# Patient Record
(demographics unavailable — no encounter records)

---

## 2025-05-14 NOTE — REVIEW OF SYSTEMS
[Eye Redness] : redness [Eye Itching] : itchy eyes [Rhinorrhea] : rhinorrhea [Nasal Congestion] : nasal congestion [Nasal Itching] : nasal itching [Throat Itching] : throat itching [Post Nasal Drip] : post nasal drip [Sneezing] : sneezing [Cough] : cough [Pruritus] : pruritus [Nl] : Gastrointestinal [Difficulty Breathing] : no dyspnea [Wheezing] : no wheezing [de-identified] : c/o pruritic ?hive on chin area

## 2025-05-14 NOTE — PHYSICAL EXAM
[Alert] : alert [No Acute Distress] : no acute distress [Normal TMs] : both tympanic membranes were normal [Normal Lips/Tongue] : the lips and tongue were normal [Normal Outer Ear/Nose] : the ears and nose were normal in appearance [Normal Tonsils] : normal tonsils [Supple] : the neck was supple [Normal Rate and Effort] : normal respiratory rhythm and effort [Normal Rate] : heart rate was normal  [Skin Intact] : skin intact  [Pale mucosa] : no pale mucosa [Boggy Nasal Turbinates] : no boggy and/or pale nasal turbinates [Pharyngeal erythema] : no pharyngeal erythema [Posterior Pharyngeal Cobblestoning] : no posterior pharyngeal cobblestoning [Clear Rhinorrhea] : no clear rhinorrhea was seen [Wheezing] : no wheezing was heard [de-identified] : mild erythema noted in nasal passages, maxillary sinuses - TTP b/l

## 2025-05-14 NOTE — HISTORY OF PRESENT ILLNESS
[de-identified] : 58-year-old female here for allergy evaluation. 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  When exposed to shellfish she does develop hives, cough, wheezing and shortness of breath.  She does have an EpiPen to use for emergencies however it is .  Patient does report that she avoids going to certain supermarkets and restaurants due to her airborne allergy to shellfish.  Patient okay with finfish.  Patient also has history of asthma for which she sees a pulmonologist once a year.  She does have albuterol to use as needed but she requires it less than once a month.  Pt does have sample of Asmanex inhaler given by pulm however she did not use it. Pt also uses Singulair 10mg from March to December with help.  to March she is off of Singulair. Her asthma does get triggered by cold air, perfumes/strong smells, dust, pollen and extreme exertion. Denies any oral steroid use in past 5 years and denies any ER visits/urgent care visits/hospitalization due to asthma flareup in the past. In year  patient did have significant asthma flareup after helping in- laws with the new house where the old carpet was getting ripped out.  She had shortness of breath and wheezing worse with mild exertion for which she was treated with oral steroids for few months.  Patient also complains of allergies in spring pollen season.  She does complain of nasal congestion, runny nose, sneezing, itchy and watery eyes bilaterally.  Her symptoms are going on for many years.  She also complains of increased nasal and eye symptoms when exposed to dust. This year since the beginning of 2025 she has been complaining of hoarseness, sinus pressure, frontal/maxillary headache, postnasal drip, cough which is sometimes dry and sometimes with discolored mucus, nasal congestion, sneezing, runny nose and itchy, watery eyes. This is the first time she is c/o sinus pressure and headaches with her nasal symptoms in spring season.  For her symptoms she is using Flonase and Azelastine nasal spray 1sp BID and Benadryl PRN which helps her nasal congestion but not her sinus pressure. For her eye symptoms pt is using Pataday eye drops BID with help. Last dose of Benadryl was last night.  She had ImmunoCAP's done more than 10 years ago and all she remembers is that she was positive to shellfish.  She does not remember what aeroallergens were positive.   History of reflux -stable for now.  Patient eats gluten-free diet with help.  Patient was diagnosed with celiac disease in  and since then she has been gluten-free.  History of allergy to dairy -avoids all dairy.  In past whenever patient was exposed to dairy she would develop acne, itchy red rash which would last for few days with abdominal pain, diarrhea and gas pain.  Patient tried Lactaid milk with similar symptoms.  Patient also tried Lactaid pills with similar symptoms.  For her rash she uses hydrocortisone 1% which does help.  Patient also has history of antibiotic allergy. Patient avoids Macrobid and Pyridium which was given to her for an UTI in the past and it caused worsening of her hives.  Once patient stopped all the medications her symptoms got better. Patient also avoids Avelox causes increase in shortness of breath and patient was hyperventilating while on Avelox.  Once patient's stopped Avelox her symptoms got better.

## 2025-05-14 NOTE — SOCIAL HISTORY
[House] : [unfilled] lives in a house  [Radiator/Baseboard] : heating provided by radiator(s)/baseboard(s) [Central] : air conditioning provided by central unit [Dust Mite Covers] : has dust mite covers [Living Area] : in living area [Dog] : dog [Humidifier] : does not use a humidifier [Dehumidifier] : does not use a dehumidifier [Feather Pillows] : does not have feather pillows [Feather Comforter] : does not have a feather comforter [Bedroom] : not in the bedroom [Smokers in Household] : there are no smokers in the home [de-identified] : air purifier [de-identified] : none [de-identified] : 1 dog

## 2025-05-14 NOTE — REVIEW OF SYSTEMS
[Eye Redness] : redness [Eye Itching] : itchy eyes [Rhinorrhea] : rhinorrhea [Nasal Congestion] : nasal congestion [Nasal Itching] : nasal itching [Throat Itching] : throat itching [Post Nasal Drip] : post nasal drip [Sneezing] : sneezing [Cough] : cough [Pruritus] : pruritus [Nl] : Gastrointestinal [Difficulty Breathing] : no dyspnea [Wheezing] : no wheezing [de-identified] : c/o pruritic ?hive on chin area

## 2025-05-14 NOTE — SOCIAL HISTORY
[House] : [unfilled] lives in a house  [Radiator/Baseboard] : heating provided by radiator(s)/baseboard(s) [Central] : air conditioning provided by central unit [Dust Mite Covers] : has dust mite covers [Living Area] : in living area [Dog] : dog [Humidifier] : does not use a humidifier [Dehumidifier] : does not use a dehumidifier [Feather Pillows] : does not have feather pillows [Feather Comforter] : does not have a feather comforter [Bedroom] : not in the bedroom [Smokers in Household] : there are no smokers in the home [de-identified] : air purifier [de-identified] : none [de-identified] : 1 dog

## 2025-05-14 NOTE — PHYSICAL EXAM
[Alert] : alert [No Acute Distress] : no acute distress [Normal TMs] : both tympanic membranes were normal [Normal Lips/Tongue] : the lips and tongue were normal [Normal Outer Ear/Nose] : the ears and nose were normal in appearance [Normal Tonsils] : normal tonsils [Supple] : the neck was supple [Normal Rate and Effort] : normal respiratory rhythm and effort [Normal Rate] : heart rate was normal  [Skin Intact] : skin intact  [Pale mucosa] : no pale mucosa [Boggy Nasal Turbinates] : no boggy and/or pale nasal turbinates [Pharyngeal erythema] : no pharyngeal erythema [Posterior Pharyngeal Cobblestoning] : no posterior pharyngeal cobblestoning [Clear Rhinorrhea] : no clear rhinorrhea was seen [Wheezing] : no wheezing was heard [de-identified] : mild erythema noted in nasal passages, maxillary sinuses - TTP b/l

## 2025-05-14 NOTE — HISTORY OF PRESENT ILLNESS
[de-identified] : 58-year-old female here for allergy evaluation. 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  When exposed to shellfish she does develop hives, cough, wheezing and shortness of breath.  She does have an EpiPen to use for emergencies however it is .  Patient does report that she avoids going to certain supermarkets and restaurants due to her airborne allergy to shellfish.  Patient okay with finfish.  Patient also has history of asthma for which she sees a pulmonologist once a year.  She does have albuterol to use as needed but she requires it less than once a month.  Pt does have sample of Asmanex inhaler given by pulm however she did not use it. Pt also uses Singulair 10mg from March to December with help.  to March she is off of Singulair. Her asthma does get triggered by cold air, perfumes/strong smells, dust, pollen and extreme exertion. Denies any oral steroid use in past 5 years and denies any ER visits/urgent care visits/hospitalization due to asthma flareup in the past. In year  patient did have significant asthma flareup after helping in- laws with the new house where the old carpet was getting ripped out.  She had shortness of breath and wheezing worse with mild exertion for which she was treated with oral steroids for few months.  Patient also complains of allergies in spring pollen season.  She does complain of nasal congestion, runny nose, sneezing, itchy and watery eyes bilaterally.  Her symptoms are going on for many years.  She also complains of increased nasal and eye symptoms when exposed to dust. This year since the beginning of 2025 she has been complaining of hoarseness, sinus pressure, frontal/maxillary headache, postnasal drip, cough which is sometimes dry and sometimes with discolored mucus, nasal congestion, sneezing, runny nose and itchy, watery eyes. This is the first time she is c/o sinus pressure and headaches with her nasal symptoms in spring season.  For her symptoms she is using Flonase and Azelastine nasal spray 1sp BID and Benadryl PRN which helps her nasal congestion but not her sinus pressure. For her eye symptoms pt is using Pataday eye drops BID with help. Last dose of Benadryl was last night.  She had ImmunoCAP's done more than 10 years ago and all she remembers is that she was positive to shellfish.  She does not remember what aeroallergens were positive.   History of reflux -stable for now.  Patient eats gluten-free diet with help.  Patient was diagnosed with celiac disease in  and since then she has been gluten-free.  History of allergy to dairy -avoids all dairy.  In past whenever patient was exposed to dairy she would develop acne, itchy red rash which would last for few days with abdominal pain, diarrhea and gas pain.  Patient tried Lactaid milk with similar symptoms.  Patient also tried Lactaid pills with similar symptoms.  For her rash she uses hydrocortisone 1% which does help.  Patient also has history of antibiotic allergy. Patient avoids Macrobid and Pyridium which was given to her for an UTI in the past and it caused worsening of her hives.  Once patient stopped all the medications her symptoms got better. Patient also avoids Avelox causes increase in shortness of breath and patient was hyperventilating while on Avelox.  Once patient's stopped Avelox her symptoms got better.

## 2025-05-14 NOTE — ASSESSMENT
[FreeTextEntry1] : 58-year-old female here for evaluation. Skin test was not done due to recent antihistamine use.  On exam maxillary sinuses were tender to palpation and pt does c/o productive cough for past 6 weeks.   Patient likely has acute sinusitis - start using Augmentin 875 mg 1 tablet twice daily for 10 days. Advised to take antibiotics with food.  Allergic rhinitis - continue to use Flonase and azelastine nasal spray 1 spray 2 times a day, Pataday eyedrops 2 times a day as needed and can use Allegra 180 mg daily as needed. Will send patient for ImmunoCAP's for aeroallergens - will call patient back with results.  Shellfish allergy -advised to avoid shellfish and carry epinephrine for emergencies.  Refills for epinephrine were sent to the pharmacy.  Instructed patient on how to use EpiPen. Patient does complain of airborne symptoms to shellfish -avoid airborne exposure to shellfish. Will check for shellfish Immunocaps - will call back with results.   History of dairy allergy - avoid dairy for now. Will check for sIgE to cow's milk - will call patient back with results.  History of celiac disease - continue with gluten-free diet.  Mild intermittent asthma - continue to use albuterol as needed and follow-up with pulmonologist. Continue to use Singulair 10mg from months of March to Dec - side effects of Singulair discussed, pt denies any symptoms while on Singulair.   Allergy to drugs Can continue to avoid Macrobid, Pyridium and Avelox.  Total time spent 50 minutes on the date of the encounter. This included time devoted to preparing to see the patient with review of previous medical record, obtaining medical history, performing physical exam, counseling and patient education with patient and family, ordering medications and lab studies, documentation in the medical record and coordination of care. The time spent is separate from time spent on separately billed procedures.